# Patient Record
Sex: FEMALE | Race: WHITE
[De-identification: names, ages, dates, MRNs, and addresses within clinical notes are randomized per-mention and may not be internally consistent; named-entity substitution may affect disease eponyms.]

---

## 2017-02-06 ENCOUNTER — HOSPITAL ENCOUNTER (OUTPATIENT)
Dept: HOSPITAL 45 - C.ULTR | Age: 57
Discharge: HOME | End: 2017-02-06
Attending: FAMILY MEDICINE
Payer: COMMERCIAL

## 2017-02-06 DIAGNOSIS — N18.9: Primary | ICD-10-CM

## 2017-02-06 NOTE — DIAGNOSTIC IMAGING REPORT
RENAL ULTRASOUND



CLINICAL HISTORY: Chronic kidney disease.    



COMPARISON STUDY:  None.



TECHNIQUE:  Sonography of the kidneys and the urinary bladder was performed.



FINDINGS: The right kidney measures 10.3 x 3.6 x 4.3 cm and the left measures

10.1 x 5.5 x 4.3 cm. There is no hydronephrosis. Renal echogenicity, size and

cortical thickness are normal. No calculi or masses are identified by

sonography. Both ureteral jets were identified. There is suspected fatty

infiltration of the liver.



IMPRESSION: 



1. Normal sonographic appearance of the kidneys. No hydronephrosis.



2. Suspected fatty infiltration of the liver.







Electronically signed by:  Philip Holland M.D.

2/6/2017 7:40 AM



Dictated Date/Time:  2/6/2017 7:38 AM

## 2017-07-14 ENCOUNTER — HOSPITAL ENCOUNTER (OUTPATIENT)
Dept: HOSPITAL 45 - C.MAMM | Age: 57
Discharge: HOME | End: 2017-07-14
Attending: FAMILY MEDICINE
Payer: COMMERCIAL

## 2017-07-14 DIAGNOSIS — Z12.31: Primary | ICD-10-CM

## 2017-07-14 NOTE — MAMMOGRAPHY REPORT
BILATERAL DIGITAL SCREENING MAMMOGRAM TOMOSYNTHESIS WITH CAD: 7/14/2017

CLINICAL HISTORY: Routine screening.  





TECHNIQUE:  Breast tomosynthesis in addition to standard 2D mammography was performed. Current study 
was also evaluated with a Computer Aided Detection (CAD) system.  



COMPARISON: Comparison is made to exams dated:  7/12/2016 mammogram, 7/10/2015 mammogram, 7/9/2014 ma
mmogram, 7/1/2013 mammogram, 6/28/2012 mammogram, and 6/24/2011 mammogram - [facility].   



BREAST COMPOSITION:  There are scattered areas of fibroglandular density in both breasts.  



FINDINGS:  No suspicious masses, calcifications, or areas of architectural distortion are noted in ei
ther breast. There has been no significant interval change compared to prior exams.  



IMPRESSION:  ACR BI-RADS CATEGORY 1: NEGATIVE

There is no mammographic evidence of malignancy. A 1 year screening mammogram is recommended.  The pa
tient will receive written notification of the results.  





Approximately 10% of breast cancers are not detected with mammography. A negative mammographic report
 should not delay biopsy if a clinically suggestive mass is present.



Jenise Angel M.D.          

/:7/14/2017 09:30:23  



Imaging Technologist: Martina HICKS(DEBORAH)(M), [facility]

letter sent: Normal 1/2  

BI-RADS Code: ACR BI-RADS Category 1: Negative

## 2018-07-19 ENCOUNTER — HOSPITAL ENCOUNTER (OUTPATIENT)
Dept: HOSPITAL 45 - C.MAMM | Age: 58
Discharge: HOME | End: 2018-07-19
Attending: FAMILY MEDICINE
Payer: COMMERCIAL

## 2018-07-19 DIAGNOSIS — Z12.31: Primary | ICD-10-CM

## 2018-07-19 NOTE — MAMMOGRAPHY REPORT
BILATERAL DIGITAL SCREENING MAMMOGRAM TOMOSYNTHESIS WITH CAD: 7/19/2018

CLINICAL HISTORY: Routine screening. Patient has no complaints.  





TECHNIQUE: The study was acquired using full field digital technology and interpreted from soft copy.
 Breast tomosynthesis in addition to standard 2D mammography was performed. Current study was also ev
aluated with a Computer Aided Detection (CAD) system.  



COMPARISON: Comparison is made to exams dated:  7/12/2016 mammogram, 7/10/2015 mammogram, 7/9/2014 ma
mmogram, 7/1/2013 mammogram, 6/28/2012 mammogram, and 6/24/2011 mammogram - [facility]
ter.   

BREAST COMPOSITION: There are scattered areas of fibroglandular density in both breasts.  



FINDINGS: 

No suspicious masses, calcifications, or areas of architectural distortion are noted in either breast
. There has been no significant interval change compared to prior exams.  





IMPRESSION: ACR BI-RADS CATEGORY 1: NEGATIVE

There is no mammographic evidence of malignancy. A 1 year screening mammogram is recommended.(07/20/2
019)  The patient will receive written notification of the results.  





Some breast cancers are not detected with mammography. A negative mammographic report should not lance
y biopsy if a clinically suggestive mass is present.



Jenise Angel M.D.          

ah/:7/19/2018 14:31:45  



Imaging Technologist: RT Dario(R)(M), [facility]

letter sent: Normal 1/2  

BI-RADS Code: ACR BI-RADS Category 1: Negative